# Patient Record
Sex: MALE | Race: WHITE | HISPANIC OR LATINO | Employment: PART TIME | URBAN - METROPOLITAN AREA
[De-identification: names, ages, dates, MRNs, and addresses within clinical notes are randomized per-mention and may not be internally consistent; named-entity substitution may affect disease eponyms.]

---

## 2017-03-11 ENCOUNTER — OFFICE VISIT (OUTPATIENT)
Dept: URGENT CARE | Facility: URGENT CARE | Age: 42
End: 2017-03-11
Payer: OTHER MISCELLANEOUS

## 2017-03-11 ENCOUNTER — RADIANT APPOINTMENT (OUTPATIENT)
Dept: GENERAL RADIOLOGY | Facility: CLINIC | Age: 42
End: 2017-03-11
Attending: PHYSICIAN ASSISTANT
Payer: OTHER MISCELLANEOUS

## 2017-03-11 VITALS
BODY MASS INDEX: 24.03 KG/M2 | WEIGHT: 140 LBS | SYSTOLIC BLOOD PRESSURE: 110 MMHG | DIASTOLIC BLOOD PRESSURE: 76 MMHG | TEMPERATURE: 98 F

## 2017-03-11 DIAGNOSIS — S46.912A STRAIN OF UPPER ARM, LEFT, INITIAL ENCOUNTER: ICD-10-CM

## 2017-03-11 DIAGNOSIS — S49.92XA LEFT UPPER ARM INJURY, INITIAL ENCOUNTER: Primary | ICD-10-CM

## 2017-03-11 DIAGNOSIS — S49.92XA LEFT UPPER ARM INJURY, INITIAL ENCOUNTER: ICD-10-CM

## 2017-03-11 PROCEDURE — 99213 OFFICE O/P EST LOW 20 MIN: CPT | Performed by: PHYSICIAN ASSISTANT

## 2017-03-11 PROCEDURE — 73030 X-RAY EXAM OF SHOULDER: CPT | Mod: LT

## 2017-03-11 RX ORDER — IBUPROFEN 600 MG/1
600 TABLET, FILM COATED ORAL EVERY 6 HOURS PRN
Qty: 30 TABLET | Refills: 1 | Status: SHIPPED | OUTPATIENT
Start: 2017-03-11

## 2017-03-11 NOTE — MR AVS SNAPSHOT
"              After Visit Summary   3/11/2017    Ivan Reyes Santiago    MRN: [id number]           Patient Information     Date Of Birth          1975        Visit Information        Provider Department      3/11/2017 10:30 AM Param Scott PA-C [facility]        Today's Diagnoses     Left upper arm injury, initial encounter    -  1    Strain of upper arm, left, initial encounter           Follow-ups after your visit        Who to contact     If you have questions or need follow up information about today's clinic visit or your schedule please contact [facility] directly at [phone].  Normal or non-critical lab and imaging results will be communicated to you by Bangeehart, letter or phone within 4 business days after the clinic has received the results. If you do not hear from us within 7 days, please contact the clinic through Bangeehart or phone. If you have a critical or abnormal lab result, we will notify you by phone as soon as possible.  Submit refill requests through txtr or call your pharmacy and they will forward the refill request to us. Please allow 3 business days for your refill to be completed.          Additional Information About Your Visit        MyChart Information     txtr lets you send messages to your doctor, view your test results, renew your prescriptions, schedule appointments and more. To sign up, go to [email or web address] . Click on \"Log in\" on the left side of the screen, which will take you to the Welcome page. Then click on \"Sign up Now\" on the right side of the page.     You will be asked to enter the access code listed below, as well as some personal information. Please follow the directions to create your username and password.     Your access code is: [email or web address]  Expires: 6/15/2017  9:21 AM     Your access code will  in 90 days. If you need help or a new code, please call your [place] clinic or " [phone].        Care EveryWhere ID     This is your Care EveryWhere ID. This could be used by other organizations to access your [place] medical records  [id number]        Your Vitals Were     Temperature BMI (Body Mass Index)                98  F (36.7  C) (Oral) 24.03 kg/m2           Blood Pressure from Last 3 Encounters:   03/11/17 110/76   09/23/16 120/68   09/14/16 102/70    Weight from Last 3 Encounters:   03/11/17 140 lb (63.5 kg)   09/23/16 143 lb (64.9 kg)   09/14/16 143 lb (64.9 kg)                 Today's Medication Changes          These changes are accurate as of: 3/11/17 11:59 PM.  If you have any questions, ask your nurse or doctor.               Start taking these medicines.        Dose/Directions    ibuprofen 600 MG tablet   Commonly known as:  ADVIL/MOTRIN   Used for:  Left upper arm injury, initial encounter, Strain of upper arm, left, initial encounter   Started by:  Param Scott PA-C        Dose:  600 mg   Take 1 tablet (600 mg) by mouth every 6 hours as needed for moderate pain   Quantity:  30 tablet   Refills:  1         Stop taking these medicines if you haven't already. Please contact your care team if you have questions.     cetirizine 10 MG tablet   Commonly known as:  zyrTEC   Stopped by:  Param Scott PA-C           methylPREDNISolone 4 MG tablet   Commonly known as:  MEDROL DOSEPAK   Stopped by:  Param Scott PA-C           PATANOL 0.1 % ophthalmic solution   Generic drug:  olopatadine   Stopped by:  Param Scott PA-C           THERAFLU FLU/COLD PO   Stopped by:  Param Scott PA-C                Where to get your medicines      These medications were sent to [id number] - [place], [place] - [address] [facility] [address], [facility] [address]    Hours:  24-hours Phone:  [phone]     ibuprofen 600 MG tablet                Primary Care Provider    None Specified       No primary provider on file.        Thank  you!     Thank you for choosing [facility]  for your care. Our goal is always to provide you with excellent care. Hearing back from our patients is one way we can continue to improve our services. Please take a few minutes to complete the written survey that you may receive in the mail after your visit with us. Thank you!             Your Updated Medication List - Protect others around you: Learn how to safely use, store and throw away your medicines at www.disposemymeds.org.          This list is accurate as of: 3/11/17 11:59 PM.  Always use your most recent med list.                   Brand Name Dispense Instructions for use    ibuprofen 600 MG tablet    ADVIL/MOTRIN    30 tablet    Take 1 tablet (600 mg) by mouth every 6 hours as needed for moderate pain       OMEGA-3 FISH OIL PO

## 2017-03-11 NOTE — NURSING NOTE
"Chief Complaint   Patient presents with     Arm Injury     lt arm injury last night at work,sustained when lifting a heavy object     Work Comp       Initial /76 (BP Location: Right arm, Patient Position: Chair, Cuff Size: Adult Regular)  Temp 98  F (36.7  C) (Oral)  Wt 140 lb (63.5 kg)  BMI 24.03 kg/m2 Estimated body mass index is 24.03 kg/(m^2) as calculated from the following:    Height as of 9/23/16: 5' 4\" (1.626 m).    Weight as of this encounter: 140 lb (63.5 kg).  Medication Reconciliation: complete   Néstor MEYERS      "

## 2017-03-11 NOTE — LETTER
[facility]  [address] [address]  [phone]        2017    REPORT OF WORK ABILITY    PATIENT DATA  Employee Name: Ivan Reyes Santiago        : 1975   [id number]  Work related injury: YES  Today's date: 2017  Date of injury: 3/11/2017     PROVIDER EVALUATION: Please fill in as needed.  Please give copy to employee for employer.  1. Diagnosis: left upper arm, shoulder strain  2. Treatment: ice, rest, medication  3. Medication: motrin  NOTE: When ordering a medication, [place] Rules require Work Comp or WC on prescriptions.  4. Return to work date: 3/11/17    RESTRICTIONS: No overhead lifting left arm, lifting restrictions of left upper arm to 10 lbs or less., Limited use - pushing/pulling left arm      Medical Examiner: Param Scott [facility] PAC  :     Next appointment: 1-2 weeks with occupational medicine if needed    CC: Employer, Managed Care Plan/Payor, Patient

## 2017-03-17 NOTE — PROGRESS NOTES
SUBJECTIVE:  Chief Complaint   Patient presents with     Arm Injury     lt arm injury last night at work,sustained when lifting a heavy object     Work Comp     Ivan Reyes Santiago is a 41 year old male presents with a chief complaint of left upper arm tenderness, shoulder area .  The injury occurred 1 day(s) ago.   The injury happened while at work. How: lifting.  The patient complained of mild and moderate pain  and has had decreased ROM.  Pain exacerbated by movement.  Relieved by rest.  He treated it initially with no therapy. This is the first time this type of injury has occurred to this patient.     Past Medical History   Diagnosis Date     Foot pain, right      Allergies   Allergen Reactions     Contrast Dye Rash     Social History   Substance Use Topics     Smoking status: Former Smoker     Quit date: 9/14/2014     Smokeless tobacco: Never Used      Comment: 1 cigarette every 2 months     Alcohol use 0.0 oz/week     0 Standard drinks or equivalent per week      Comment: occasional       ROS:  CONSTITUTIONAL:NEGATIVE for fever, chills, change in weight  INTEGUMENTARY/SKIN: NEGATIVE for worrisome rashes, moles or lesions  MUSCULOSKELETAL: POSITIVE  for left shoulder, upper arm  VASC: NEGATIVE for cold extremities  NEURO: NEGATIVE for weakness, dizziness or paresthesias    EXAM:   /76 (BP Location: Right arm, Patient Position: Chair, Cuff Size: Adult Regular)  Temp 98  F (36.7  C) (Oral)  Wt 140 lb (63.5 kg)  BMI 24.03 kg/m2  Gen: healthy,alert,no distress  Extremity: shoulder has point tenderness anterior and superior aspect.   There is not compromise to the distal circulation.  Pulses are +2 and CRT is brisk  GENERAL APPEARANCE: healthy, alert and no distress  EXTREMITIES: peripheral pulses normal  MS:  Positive for left shoulder pain with ROM  SKIN: no suspicious lesions or rashes  NEURO: Normal strength and tone, sensory exam grossly normal, mentation intact and speech normal    X-RAY was done and  negative for acute changes including fracture Xray read by Param Scott at time of visit    ASSESSMENT/PLAN:      ICD-10-CM    1. Left upper arm injury, initial encounter S49.92XA XR Shoulder Left G/E 3 Views     ibuprofen (ADVIL/MOTRIN) 600 MG tablet   2. Strain of upper arm, left, initial encounter S46.912A XR Shoulder Left G/E 3 Views     ibuprofen (ADVIL/MOTRIN) 600 MG tablet     RICE treatment: Rest, Ice, compression, elevation   Work comp follow up advised  Note for work written

## 2017-03-26 ENCOUNTER — OFFICE VISIT (OUTPATIENT)
Dept: URGENT CARE | Facility: URGENT CARE | Age: 42
End: 2017-03-26
Payer: COMMERCIAL

## 2017-03-26 VITALS
WEIGHT: 142.8 LBS | TEMPERATURE: 98.5 F | OXYGEN SATURATION: 98 % | HEIGHT: 64 IN | HEART RATE: 82 BPM | BODY MASS INDEX: 24.38 KG/M2

## 2017-03-26 DIAGNOSIS — R07.0 THROAT PAIN: Primary | ICD-10-CM

## 2017-03-26 DIAGNOSIS — J04.2 LARYNGOTRACHEITIS: ICD-10-CM

## 2017-03-26 LAB
DEPRECATED S PYO AG THROAT QL EIA: NORMAL
MICRO REPORT STATUS: NORMAL
SPECIMEN SOURCE: NORMAL

## 2017-03-26 PROCEDURE — 99213 OFFICE O/P EST LOW 20 MIN: CPT | Performed by: FAMILY MEDICINE

## 2017-03-26 PROCEDURE — 87880 STREP A ASSAY W/OPTIC: CPT | Performed by: PHYSICIAN ASSISTANT

## 2017-03-26 PROCEDURE — 87081 CULTURE SCREEN ONLY: CPT | Performed by: PHYSICIAN ASSISTANT

## 2017-03-26 RX ORDER — AZITHROMYCIN 250 MG/1
TABLET, FILM COATED ORAL
Qty: 6 TABLET | Refills: 0 | Status: SHIPPED | OUTPATIENT
Start: 2017-03-26 | End: 2018-02-05

## 2017-03-26 NOTE — NURSING NOTE
"Chief Complaint   Patient presents with     Flu     body aches, chils, sweats 3x weeks     Pharyngitis     sore throat, cough 2xweeks        Initial Pulse 82  Temp 98.5  F (36.9  C) (Oral)  Ht 5' 4\" (1.626 m)  Wt 142 lb 12.8 oz (64.8 kg)  SpO2 98%  BMI 24.51 kg/m2 Estimated body mass index is 24.51 kg/(m^2) as calculated from the following:    Height as of this encounter: 5' 4\" (1.626 m).    Weight as of this encounter: 142 lb 12.8 oz (64.8 kg).  Medication Reconciliation: complete    "

## 2017-03-26 NOTE — MR AVS SNAPSHOT
"              After Visit Summary   3/26/2017    Ivan Reyes Santiago    MRN: [id number]           Patient Information     Date Of Birth          1975        Visit Information        Provider Department      3/26/2017 6:30 PM Santi Schmidt MD [facility]        Today's Diagnoses     Throat pain    -  1    Laryngotracheitis           Follow-ups after your visit        Who to contact     If you have questions or need follow up information about today's clinic visit or your schedule please contact [facility] directly at [phone].  Normal or non-critical lab and imaging results will be communicated to you by Renkoohart, letter or phone within 4 business days after the clinic has received the results. If you do not hear from us within 7 days, please contact the clinic through Renkoohart or phone. If you have a critical or abnormal lab result, we will notify you by phone as soon as possible.  Submit refill requests through Kihon or call your pharmacy and they will forward the refill request to us. Please allow 3 business days for your refill to be completed.          Additional Information About Your Visit        MyChart Information     Kihon lets you send messages to your doctor, view your test results, renew your prescriptions, schedule appointments and more. To sign up, go to [email or web address] . Click on \"Log in\" on the left side of the screen, which will take you to the Welcome page. Then click on \"Sign up Now\" on the right side of the page.     You will be asked to enter the access code listed below, as well as some personal information. Please follow the directions to create your username and password.     Your access code is: [email or web address]  Expires: 6/15/2017  9:21 AM     Your access code will  in 90 days. If you need help or a new code, please call your [place] clinic or [phone].        Care EveryWhere ID     This is your " "Care EveryWhere ID. This could be used by other organizations to access your [facility] medical records  [id number]        Your Vitals Were     Pulse Temperature Height Pulse Oximetry BMI (Body Mass Index)       82 98.5  F (36.9  C) (Oral) 5' 4\" (1.626 m) 98% 24.51 kg/m2        Blood Pressure from Last 3 Encounters:   03/11/17 110/76   09/23/16 120/68   09/14/16 102/70    Weight from Last 3 Encounters:   03/26/17 142 lb 12.8 oz (64.8 kg)   03/11/17 140 lb (63.5 kg)   09/23/16 143 lb (64.9 kg)              We Performed the Following     Beta strep group A culture     Rapid strep screen          Today's Medication Changes          These changes are accurate as of: 3/26/17  7:50 PM.  If you have any questions, ask your nurse or doctor.               Start taking these medicines.        Dose/Directions    azithromycin 250 MG tablet   Commonly known as:  ZITHROMAX   Used for:  Throat pain, Laryngotracheitis   Started by:  Santi Schmidt MD        Two tablets first day, then one tablet daily for four days.   Quantity:  6 tablet   Refills:  0            Where to get your medicines      These medications were sent to [facility] [id number] [place] [address] [address] AT [facility] [address]  [address] [facility] [address]    Hours:  24-hours Phone:  [phone]     azithromycin 250 MG tablet                Primary Care Provider    None Specified       No primary provider on file.        Thank you!     Thank you for choosing [facility]  for your care. Our goal is always to provide you with excellent care. Hearing back from our patients is one way we can continue to improve our services. Please take a few minutes to complete the written survey that you may receive in the mail after your visit with us. Thank you!             Your Updated Medication List - Protect others around you: Learn how to safely use, store and throw away your medicines at " www.disposemymeds.org.          This list is accurate as of: 3/26/17  7:50 PM.  Always use your most recent med list.                   Brand Name Dispense Instructions for use    azithromycin 250 MG tablet    ZITHROMAX    6 tablet    Two tablets first day, then one tablet daily for four days.       ibuprofen 600 MG tablet    ADVIL/MOTRIN    30 tablet    Take 1 tablet (600 mg) by mouth every 6 hours as needed for moderate pain       OMEGA-3 FISH OIL PO

## 2017-03-27 NOTE — PROGRESS NOTES
Complanis of sore throat for three days.  Associated symptoms include associate uri nofever tender swollen glands positive strep contact slight headache mild stomach upset.    Review of systems shows no problems with vision,hearing or learning  No heart murmer, asthma, bowel or bladder problems, rashes, back or muscle problems,dental problems, headaches,balance or frequent infections  On exam the vital signs are stable. no neck nodes no necks stiffness or thyromegaly.    No bruits, murmers, rubs or extrasounds. No cardiomegaly or chest wall tenderness. Lungs clear, no abdominal masses or organomegaly. No CVA tenderness.    Strep test is negative   Treatment  For laryngotracheitis : zpak  and adjunctive vitimin c  (R07.0) Throat pain  (primary encounter diagnosis)  Comment:   Plan: Rapid strep screen, Beta strep group A culture,        azithromycin (ZITHROMAX) 250 MG tablet            (J04.2) Laryngotracheitis  Comment:   Plan: azithromycin (ZITHROMAX) 250 MG tablet

## 2017-03-28 LAB
BACTERIA SPEC CULT: NORMAL
MICRO REPORT STATUS: NORMAL
SPECIMEN SOURCE: NORMAL

## 2018-02-05 ENCOUNTER — OFFICE VISIT (OUTPATIENT)
Dept: URGENT CARE | Facility: URGENT CARE | Age: 43
End: 2018-02-05
Payer: COMMERCIAL

## 2018-02-05 VITALS
TEMPERATURE: 98.1 F | BODY MASS INDEX: 23.71 KG/M2 | DIASTOLIC BLOOD PRESSURE: 64 MMHG | OXYGEN SATURATION: 99 % | HEART RATE: 87 BPM | SYSTOLIC BLOOD PRESSURE: 102 MMHG | WEIGHT: 138.13 LBS

## 2018-02-05 DIAGNOSIS — J11.1 INFLUENZA: Primary | ICD-10-CM

## 2018-02-05 PROCEDURE — 99214 OFFICE O/P EST MOD 30 MIN: CPT | Performed by: FAMILY MEDICINE

## 2018-02-05 RX ORDER — OSELTAMIVIR PHOSPHATE 75 MG/1
75 CAPSULE ORAL 2 TIMES DAILY
Qty: 10 CAPSULE | Refills: 0 | Status: SHIPPED | OUTPATIENT
Start: 2018-02-05 | End: 2018-02-10

## 2018-02-05 NOTE — PROGRESS NOTES
SUBJECTIVE: Ivan Reyes Santiago is a 42 year old male presenting with a chief complaint of fever, nasal congestion and cough .  Onset of symptoms was 2 day(s) ago.  Course of illness is worsening.    Severity moderate  Current and Associated symptoms: stuffy nose and cough - non-productive  Treatment measures tried include Tylenol/Ibuprofen.  Predisposing factors include None.    Past Medical History:   Diagnosis Date     Foot pain, right      Allergies   Allergen Reactions     Contrast Dye Rash     Social History   Substance Use Topics     Smoking status: Former Smoker     Quit date: 9/14/2014     Smokeless tobacco: Never Used      Comment: 1 cigarette every 2 months     Alcohol use 0.0 oz/week     0 Standard drinks or equivalent per week      Comment: occasional       ROS:  SKIN: no rash  GI: no vomiting    OBJECTIVE:  /64 (Cuff Size: Adult Regular)  Pulse 87  Temp 98.1  F (36.7  C) (Oral)  Wt 138 lb 2 oz (62.7 kg)  SpO2 99%  BMI 23.71 kg/h8YLWYPME APPEARANCE: healthy, alert and no distress  EYES: EOMI,  PERRL, conjunctiva clear  HENT: ear canals and TM's normal.  Nose and mouth without ulcers, erythema or lesions  NECK: supple, nontender, no lymphadenopathy  RESP: lungs clear to auscultation - no rales, rhonchi or wheezes  SKIN: no suspicious lesions or rashes      ICD-10-CM    1. Influenza J11.1 oseltamivir (TAMIFLU) 75 MG capsule       Fluids/Rest, f/u if worse/not any better

## 2018-02-05 NOTE — PATIENT INSTRUCTIONS
Influenza (Adult)    Influenza is also called the flu. It is a viral illness that affects the air passages of your lungs. It is different from the common cold. The flu can easily be passed from one to person to another. It may be spread through the air by coughing and sneezing. Or it can be spread by touching the sick person and then touching your own eyes, nose, or mouth.  The flu starts 1 to 3 days after you are exposed to the flu virus. It may last for 1 to 2 weeks but many people feel tired or fatigued for many weeks afterward. You usually don t need to take antibiotics unless you have a complication. This might be an ear or sinus infection or pneumonia.  Symptoms of the flu may be mild or severe. They can include extreme tiredness (wanting to stay in bed all day), chills, fevers, muscle aches, soreness with eye movement, headache, and a dry, hacking cough.  Home care  Follow these guidelines when caring for yourself at home:    Avoid being around cigarette smoke, whether yours or other people s.    Acetaminophen or ibuprofen will help ease your fever, muscle aches, and headache. Don t give aspirin to anyone younger than 18 who has the flu. Aspirin can harm the liver.    Nausea and loss of appetite are common with the flu. Eat light meals. Drink 6 to 8 glasses of liquids every day. Good choices are water, sport drinks, soft drinks without caffeine, juices, tea, and soup. Extra fluids will also help loosen secretions in your nose and lungs.    Over-the-counter cold medicines will not make the flu go away faster. But the medicines may help with coughing, sore throat, and congestion in your nose and sinuses. Don t use a decongestant if you have high blood pressure.    Stay home until your fever has been gone for at least 24 hours without using medicine to reduce fever.  Follow-up care  Follow up with your healthcare provider, or as advised, if you are not getting better over the next week.  If you are age 65 or  older, talk with your provider about getting a pneumococcal vaccine every 5 years. You should also get this vaccine if you have chronic asthma or COPD. All adults should get a flu vaccine every fall. Ask your provider about this.  When to seek medical advice  Call your healthcare provider right away if any of these occur:    Cough with lots of colored mucus (sputum) or blood in your mucus    Chest pain, shortness of breath, wheezing, or trouble breathing    Severe headache, or face, neck, or ear pain    New rash with fever    Fever of 100.4 F (38 C) or higher, or as directed by your healthcare provider    Confusion, behavior change, or seizure    Severe weakness or dizziness    You get a new fever or cough after getting better for a few days  Date Last Reviewed: 1/1/2017 2000-2017 [employer] [address] [place] [address], [place] [address]. All rights reserved. This information is not intended as a substitute for professional medical care. Always follow your healthcare professional's instructions.

## 2018-02-05 NOTE — NURSING NOTE
"Chief Complaint   Patient presents with     Cough     sx for 2 days,cough,fever,chills,aches       Initial /64 (Cuff Size: Adult Regular)  Pulse 87  Temp 98.1  F (36.7  C) (Oral)  Wt 138 lb 2 oz (62.7 kg)  SpO2 99%  BMI 23.71 kg/m2 Estimated body mass index is 23.71 kg/(m^2) as calculated from the following:    Height as of 3/26/17: 5' 4\" (1.626 m).    Weight as of this encounter: 138 lb 2 oz (62.7 kg).  Medication Reconciliation: complete Néstor MEYERS    "
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
## 2020-08-05 ENCOUNTER — HOSPITAL ENCOUNTER (EMERGENCY)
Facility: CLINIC | Age: 45
Discharge: HOME | End: 2020-08-05
Attending: NURSE PRACTITIONER | Admitting: NURSE PRACTITIONER
Payer: COMMERCIAL

## 2020-08-05 ENCOUNTER — APPOINTMENT (OUTPATIENT)
Dept: GENERAL RADIOLOGY | Facility: CLINIC | Age: 45
End: 2020-08-05
Attending: NURSE PRACTITIONER
Payer: COMMERCIAL

## 2020-08-05 VITALS
HEIGHT: 64 IN | BODY MASS INDEX: 23.9 KG/M2 | RESPIRATION RATE: 16 BRPM | SYSTOLIC BLOOD PRESSURE: 144 MMHG | OXYGEN SATURATION: 99 % | TEMPERATURE: 98.5 F | DIASTOLIC BLOOD PRESSURE: 98 MMHG | WEIGHT: 140 LBS

## 2020-08-05 DIAGNOSIS — M25.561 RIGHT KNEE PAIN: ICD-10-CM

## 2020-08-05 DIAGNOSIS — M25.469 EFFUSION OF KNEE: ICD-10-CM

## 2020-08-05 PROCEDURE — 73562 X-RAY EXAM OF KNEE 3: CPT | Mod: RT

## 2020-08-05 PROCEDURE — 25000132 ZZH RX MED GY IP 250 OP 250 PS 637: Performed by: NURSE PRACTITIONER

## 2020-08-05 PROCEDURE — 99283 EMERGENCY DEPT VISIT LOW MDM: CPT

## 2020-08-05 RX ORDER — ACETAMINOPHEN 325 MG/1
975 TABLET ORAL ONCE
Status: COMPLETED | OUTPATIENT
Start: 2020-08-05 | End: 2020-08-05

## 2020-08-05 RX ORDER — IBUPROFEN 600 MG/1
600 TABLET, FILM COATED ORAL ONCE
Status: COMPLETED | OUTPATIENT
Start: 2020-08-05 | End: 2020-08-05

## 2020-08-05 RX ADMIN — ACETAMINOPHEN 975 MG: 325 TABLET, FILM COATED ORAL at 20:39

## 2020-08-05 RX ADMIN — IBUPROFEN 600 MG: 600 TABLET ORAL at 20:39

## 2020-08-05 ASSESSMENT — MIFFLIN-ST. JEOR: SCORE: 1426.04

## 2020-08-05 ASSESSMENT — ENCOUNTER SYMPTOMS: NUMBNESS: 0

## 2020-08-05 NOTE — LETTER
August 5, 2020      To Whom It May Concern:      Ivan Reyes Santiago was seen in our Emergency Department today, 08/05/20.  I expect his condition to improve over the next 2-3 days.  He may return to work after follow up with ortho.    Sincerely,        Jillian Jaeger RN
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
## 2020-08-10 ENCOUNTER — TRANSFERRED RECORDS (OUTPATIENT)
Dept: HEALTH INFORMATION MANAGEMENT | Facility: CLINIC | Age: 45
End: 2020-08-10

## 2024-02-24 ENCOUNTER — OFFICE VISIT (OUTPATIENT)
Dept: URGENT CARE | Facility: URGENT CARE | Age: 49
End: 2024-02-24
Payer: COMMERCIAL

## 2024-02-24 ENCOUNTER — HOSPITAL ENCOUNTER (EMERGENCY)
Facility: CLINIC | Age: 49
Discharge: HOME | End: 2024-02-24
Attending: EMERGENCY MEDICINE | Admitting: EMERGENCY MEDICINE
Payer: COMMERCIAL

## 2024-02-24 VITALS
SYSTOLIC BLOOD PRESSURE: 157 MMHG | OXYGEN SATURATION: 100 % | RESPIRATION RATE: 18 BRPM | HEIGHT: 64 IN | BODY MASS INDEX: 26.63 KG/M2 | DIASTOLIC BLOOD PRESSURE: 94 MMHG | TEMPERATURE: 98.7 F | WEIGHT: 156 LBS | HEART RATE: 81 BPM

## 2024-02-24 VITALS
SYSTOLIC BLOOD PRESSURE: 142 MMHG | OXYGEN SATURATION: 99 % | TEMPERATURE: 96.9 F | HEART RATE: 83 BPM | DIASTOLIC BLOOD PRESSURE: 97 MMHG | BODY MASS INDEX: 26.74 KG/M2 | RESPIRATION RATE: 16 BRPM | WEIGHT: 155.8 LBS

## 2024-02-24 DIAGNOSIS — R29.898 HEAVINESS OF UPPER EXTREMITY: ICD-10-CM

## 2024-02-24 DIAGNOSIS — R20.0 NUMBNESS AND TINGLING OF LEFT SIDE OF FACE: Primary | ICD-10-CM

## 2024-02-24 DIAGNOSIS — R20.2 NUMBNESS AND TINGLING OF LEFT SIDE OF FACE: Primary | ICD-10-CM

## 2024-02-24 DIAGNOSIS — R07.89 ATYPICAL CHEST PAIN: ICD-10-CM

## 2024-02-24 DIAGNOSIS — M54.12 CERVICAL RADICULOPATHY: ICD-10-CM

## 2024-02-24 LAB
ANION GAP SERPL CALCULATED.3IONS-SCNC: 10 MMOL/L (ref 7–15)
ATRIAL RATE - MUSE: 86 BPM
BASOPHILS # BLD AUTO: 0 10E3/UL (ref 0–0.2)
BASOPHILS NFR BLD AUTO: 0 %
BUN SERPL-MCNC: 17.6 MG/DL (ref 6–20)
CALCIUM SERPL-MCNC: 9.5 MG/DL (ref 8.6–10)
CHLORIDE SERPL-SCNC: 103 MMOL/L (ref 98–107)
CREAT SERPL-MCNC: 1.31 MG/DL (ref 0.67–1.17)
DEPRECATED HCO3 PLAS-SCNC: 25 MMOL/L (ref 22–29)
DIASTOLIC BLOOD PRESSURE - MUSE: NORMAL MMHG
EGFRCR SERPLBLD CKD-EPI 2021: 66 ML/MIN/1.73M2
EOSINOPHIL # BLD AUTO: 0.2 10E3/UL (ref 0–0.7)
EOSINOPHIL NFR BLD AUTO: 2 %
ERYTHROCYTE [DISTWIDTH] IN BLOOD BY AUTOMATED COUNT: 12.7 % (ref 10–15)
GLUCOSE SERPL-MCNC: 114 MG/DL (ref 70–99)
HCT VFR BLD AUTO: 42.5 % (ref 40–53)
HGB BLD-MCNC: 14.7 G/DL (ref 13.3–17.7)
HOLD SPECIMEN: NORMAL
IMM GRANULOCYTES # BLD: 0.1 10E3/UL
IMM GRANULOCYTES NFR BLD: 1 %
INTERPRETATION ECG - MUSE: NORMAL
LYMPHOCYTES # BLD AUTO: 1.8 10E3/UL (ref 0.8–5.3)
LYMPHOCYTES NFR BLD AUTO: 23 %
MCH RBC QN AUTO: 30.4 PG (ref 26.5–33)
MCHC RBC AUTO-ENTMCNC: 34.6 G/DL (ref 31.5–36.5)
MCV RBC AUTO: 88 FL (ref 78–100)
MONOCYTES # BLD AUTO: 0.6 10E3/UL (ref 0–1.3)
MONOCYTES NFR BLD AUTO: 8 %
NEUTROPHILS # BLD AUTO: 5.1 10E3/UL (ref 1.6–8.3)
NEUTROPHILS NFR BLD AUTO: 66 %
NRBC # BLD AUTO: 0 10E3/UL
NRBC BLD AUTO-RTO: 0 /100
P AXIS - MUSE: 20 DEGREES
PLATELET # BLD AUTO: 271 10E3/UL (ref 150–450)
POTASSIUM SERPL-SCNC: 3.7 MMOL/L (ref 3.4–5.3)
PR INTERVAL - MUSE: 164 MS
QRS DURATION - MUSE: 88 MS
QT - MUSE: 342 MS
QTC - MUSE: 409 MS
R AXIS - MUSE: 34 DEGREES
RBC # BLD AUTO: 4.83 10E6/UL (ref 4.4–5.9)
SODIUM SERPL-SCNC: 138 MMOL/L (ref 135–145)
SYSTOLIC BLOOD PRESSURE - MUSE: NORMAL MMHG
T AXIS - MUSE: -17 DEGREES
TROPONIN T SERPL HS-MCNC: 6 NG/L
VENTRICULAR RATE- MUSE: 86 BPM
WBC # BLD AUTO: 7.7 10E3/UL (ref 4–11)

## 2024-02-24 PROCEDURE — 99205 OFFICE O/P NEW HI 60 MIN: CPT | Performed by: NURSE PRACTITIONER

## 2024-02-24 PROCEDURE — 84484 ASSAY OF TROPONIN QUANT: CPT | Performed by: EMERGENCY MEDICINE

## 2024-02-24 PROCEDURE — 85025 COMPLETE CBC W/AUTO DIFF WBC: CPT | Performed by: EMERGENCY MEDICINE

## 2024-02-24 PROCEDURE — 80048 BASIC METABOLIC PNL TOTAL CA: CPT | Performed by: EMERGENCY MEDICINE

## 2024-02-24 PROCEDURE — 93005 ELECTROCARDIOGRAM TRACING: CPT

## 2024-02-24 PROCEDURE — 36415 COLL VENOUS BLD VENIPUNCTURE: CPT | Performed by: EMERGENCY MEDICINE

## 2024-02-24 PROCEDURE — 99284 EMERGENCY DEPT VISIT MOD MDM: CPT

## 2024-02-24 RX ORDER — METHOCARBAMOL 500 MG/1
1000 TABLET, FILM COATED ORAL 4 TIMES DAILY PRN
Qty: 20 TABLET | Refills: 0 | Status: SHIPPED | OUTPATIENT
Start: 2024-02-24

## 2024-02-24 RX ORDER — ROSUVASTATIN CALCIUM 20 MG/1
20 TABLET, COATED ORAL DAILY
COMMUNITY

## 2024-02-24 RX ORDER — PREDNISONE 20 MG/1
TABLET ORAL
Qty: 10 TABLET | Refills: 0 | Status: SHIPPED | OUTPATIENT
Start: 2024-02-24

## 2024-02-24 ASSESSMENT — ACTIVITIES OF DAILY LIVING (ADL): ADLS_ACUITY_SCORE: 33

## 2024-02-24 ASSESSMENT — COLUMBIA-SUICIDE SEVERITY RATING SCALE - C-SSRS
2. HAVE YOU ACTUALLY HAD ANY THOUGHTS OF KILLING YOURSELF IN THE PAST MONTH?: NO
6. HAVE YOU EVER DONE ANYTHING, STARTED TO DO ANYTHING, OR PREPARED TO DO ANYTHING TO END YOUR LIFE?: NO
1. IN THE PAST MONTH, HAVE YOU WISHED YOU WERE DEAD OR WISHED YOU COULD GO TO SLEEP AND NOT WAKE UP?: NO

## 2024-02-24 NOTE — ED TRIAGE NOTES
Having left facial, shoulder and arm numbness for last few days. Left face with mild swelling noted.  Denies of any weakness in any extremities. S/p left eye surgery for retinal detachment one month ago and been recovering well.      Triage Assessment (Adult)       Row Name 02/24/24 1514          Triage Assessment    Airway WDL WDL        Respiratory WDL    Respiratory WDL WDL        Skin Circulation/Temperature WDL    Skin Circulation/Temperature WDL WDL        Cardiac WDL    Cardiac WDL WDL        Peripheral/Neurovascular WDL    Peripheral Neurovascular WDL WDL        Cognitive/Neuro/Behavioral WDL    Cognitive/Neuro/Behavioral WDL WDL

## 2024-02-24 NOTE — PATIENT INSTRUCTIONS
Patient is referred to [facility] emergency room emergently for assessment.   Opioid Pregnancy And Lactation Text: These medications can lead to premature delivery and should be avoided during pregnancy. These medications are also present in breast milk in small amounts.

## 2024-02-25 NOTE — ED PROVIDER NOTES
History     Chief Complaint:  Numbness       HPI   Ivan Reyes Santiago is a 48 year old male with history of hypertension, hypercholesterolemia, 2 episodes of retinal detachment on the left side with recent surgery presents with discomfort and numbness or fullness feeling to the left lower jaw as well as to the left neck and arm.  He notes intermittent symptoms to the face and can up points to the parotid area.  Denies any pain with chewing and denies any dental pain.  He has discomfort into the left arm and neck.  He has some tingling and numbness to the palm.  He has no symptoms in the leg.  He notes that his vision is gradually coming back.  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  He is right-hand dominant.  Denies any injury.  He has no history of neck or back issues.  He has no history of stroke.  Denies chest pain or shortness of breath.      Independent Historian:    Patient and wife    Review of External Notes:  N/A    Medications:    methocarbamol (ROBAXIN) 500 MG tablet  predniSONE (DELTASONE) 20 MG tablet  ibuprofen (ADVIL/MOTRIN) 600 MG tablet  Omega-3 Fatty Acids (OMEGA-3 FISH OIL PO)  rosuvastatin (CRESTOR) 20 MG tablet        Past Medical History:    Past Medical History:   Diagnosis Date    Foot pain, right        Past Surgical History:    Past Surgical History:   Procedure Laterality Date    NO HISTORY OF SURGERY            Physical Exam   No data found.       Physical Exam  GENERAL: well developed, pleasant  HEAD: atraumatic  EYES: pupils reactive, extraocular muscles intact, conjunctivae normal  ENT:  mucus membranes moist, dentition is nontender to percussion with a tongue blade, palpating the parotid gland and looking intraorally no significant tenderness to this area and no drainage from the duct, no obvious facial swelling that he is talking about I cannot appreciate it no facial warm or redness, TMs are clear no herpetic lesion no otitis  media, no pain to the mastoid bone  NECK:  trachea midline, normal range of motion, Spurling's test is mildly positive with symptoms on the left  RESPIRATORY: no tachypnea, breath sounds clear to auscultation   CVS: normal S1/S2, no murmurs, intact distal pulses  ABDOMEN: soft, nontender, nondistention  MUSCULOSKELETAL: no deformities  SKIN: warm and dry, no acute rashes or ulceration  NEURO: GCS 15, cranial nerves intact, alert and oriented x3, normal finger-to-nose equal  strength Romberg is normal equal biceps and triceps, normal gait  PSYCH:  Mood/affect normal      Emergency Department Course     ECG results from 02/24/24   EKG 12-lead, tracing only     Value    Systolic Blood Pressure     Diastolic Blood Pressure     Ventricular Rate 86    Atrial Rate 86    OR Interval 164    QRS Duration 88        QTc 409    P Axis 20    R AXIS 34    T Axis -17    Interpretation ECG      Sinus rhythm  Minimal voltage criteria for LVH, may be normal variant ( R in aVL )  Inferior infarct , age undetermined  Abnormal ECG  No previous ECGs available  Confirmed by GENERATED REPORT, COMPUTER ([id number]),  MARIBEL AUGUST ([id number]) on 2/24/2024 9:24:09 PM           Imaging:  Echo Stress Echocardiogram    (Results Pending)     Report per na    Laboratory:  Labs Ordered and Resulted from Time of ED Arrival to Time of ED Departure   BASIC METABOLIC PANEL - Abnormal       Result Value    Sodium 138      Potassium 3.7      Chloride 103      Carbon Dioxide (CO2) 25      Anion Gap 10      Urea Nitrogen 17.6      Creatinine 1.31 (*)     GFR Estimate 66      Calcium 9.5      Glucose 114 (*)    TROPONIN T, HIGH SENSITIVITY - Normal    Troponin T, High Sensitivity 6     CBC WITH PLATELETS AND DIFFERENTIAL    WBC Count 7.7      RBC Count 4.83      Hemoglobin 14.7      Hematocrit 42.5      MCV 88      MCH 30.4      MCHC 34.6      RDW 12.7      Platelet Count 271      % Neutrophils 66      % Lymphocytes 23      % Monocytes 8      %  Eosinophils 2      % Basophils 0      % Immature Granulocytes 1      NRBCs per 100 WBC 0      Absolute Neutrophils 5.1      Absolute Lymphocytes 1.8      Absolute Monocytes 0.6      Absolute Eosinophils 0.2      Absolute Basophils 0.0      Absolute Immature Granulocytes 0.1      Absolute NRBCs 0.0          Procedures       Emergency Department Course & Assessments:             Interventions:  Medications - No data to display     Assessments:      Independent Interpretation (X-rays, CTs, rhythm strip):  None    Consultations/Discussion of Management or Tests:  None       Social Determinants of Health affecting care:  na     Disposition:  The patient was discharged to home.     Impression & Plan    CMS Diagnoses: None          Medical Decision Making:    Patient presents with discomfort to the jaw as well as to the arm.  Patient recently had surgery to his eye so has recently restarted to work.  Certainly musculoskeletal or cervical radiculopathy, acute coronary syndrome, parotitis, TMJ, mastoiditis dental etiology amongst others are considered.  Cardiac workup is negative.  Does have Q waves in inferior leads but troponin is normal.  Do not have an old EKG to compare to.  Palpation of the face did not feel any fullness or swelling or evidence of parotitis.  There is no pain over the mastoid bone.  There is no pain to his dentition.  Discussed outpatient management with him including course of Tylenol, Robaxin and prednisone and outpatient stress test given the abnormal EKG.    Critical Care time:  was 0 minutes for this patient excluding procedures.    Diagnosis:    ICD-10-CM    1. Cervical radiculopathy  M54.12       2. Atypical chest pain  R07.89 Echo Stress Echocardiogram           Discharge Medications:  Discharge Medication List as of 2/24/2024  9:31 PM        START taking these medications    Details   methocarbamol (ROBAXIN) 500 MG tablet Take 2 tablets (1,000 mg) by mouth 4 times daily as needed for muscle  spasms, Disp-20 tablet, R-0, E-Prescribe      predniSONE (DELTASONE) 20 MG tablet Take two tablets (= 40mg) each day for 5 (five) days, Disp-10 tablet, R-0, E-Prescribe                Nagi Putnam MD  2/24/2024   Nagi Putnam MD Adams, Shaun L, MD  02/25/24 1784

## 2024-03-01 ENCOUNTER — HOSPITAL ENCOUNTER (OUTPATIENT)
Dept: CARDIOLOGY | Facility: CLINIC | Age: 49
Discharge: HOME | End: 2024-03-01
Attending: EMERGENCY MEDICINE | Admitting: EMERGENCY MEDICINE
Payer: COMMERCIAL

## 2024-03-01 DIAGNOSIS — R07.89 ATYPICAL CHEST PAIN: ICD-10-CM

## 2024-03-01 PROCEDURE — 93325 DOPPLER ECHO COLOR FLOW MAPG: CPT | Mod: 26 | Performed by: INTERNAL MEDICINE

## 2024-03-01 PROCEDURE — 93321 DOPPLER ECHO F-UP/LMTD STD: CPT | Mod: 26 | Performed by: INTERNAL MEDICINE

## 2024-03-01 PROCEDURE — 93325 DOPPLER ECHO COLOR FLOW MAPG: CPT | Mod: TC

## 2024-03-01 PROCEDURE — 93016 CV STRESS TEST SUPVJ ONLY: CPT | Performed by: INTERNAL MEDICINE

## 2024-03-01 PROCEDURE — 93018 CV STRESS TEST I&R ONLY: CPT | Performed by: INTERNAL MEDICINE

## 2024-03-01 PROCEDURE — 93350 STRESS TTE ONLY: CPT | Mod: 26 | Performed by: INTERNAL MEDICINE

## 2024-06-16 ENCOUNTER — HEALTH MAINTENANCE LETTER (OUTPATIENT)
Age: 49
End: 2024-06-16

## 2025-05-27 ENCOUNTER — APPOINTMENT (OUTPATIENT)
Dept: URBAN - METROPOLITAN AREA CLINIC 256 | Age: 50
Setting detail: DERMATOLOGY
End: 2025-05-28

## 2025-05-27 VITALS — WEIGHT: 145 LBS | HEIGHT: 64 IN

## 2025-05-27 DIAGNOSIS — L81.0 POSTINFLAMMATORY HYPERPIGMENTATION: ICD-10-CM

## 2025-05-27 DIAGNOSIS — L82.0 INFLAMED SEBORRHEIC KERATOSIS: ICD-10-CM

## 2025-05-27 DIAGNOSIS — L91.8 OTHER HYPERTROPHIC DISORDERS OF THE SKIN: ICD-10-CM

## 2025-05-27 PROCEDURE — OTHER PRESCRIPTION MEDICATION MANAGEMENT: OTHER

## 2025-05-27 PROCEDURE — OTHER SKIN TAG REMOVAL: OTHER

## 2025-05-27 PROCEDURE — OTHER PRESCRIPTION: OTHER

## 2025-05-27 PROCEDURE — OTHER MIPS QUALITY: OTHER

## 2025-05-27 PROCEDURE — OTHER LIQUID NITROGEN: OTHER

## 2025-05-27 PROCEDURE — OTHER PATIENT SPECIFIC COUNSELING: OTHER

## 2025-05-27 PROCEDURE — OTHER COUNSELING: OTHER

## 2025-05-27 PROCEDURE — OTHER PHOTO-DOCUMENTATION: OTHER

## 2025-05-27 RX ORDER — FLUOCINOLONE ACETONIDE, HYDROQUINONE, AND TRETINOIN .1; 40; .5 MG/G; MG/G; MG/G
CREAM TOPICAL
Qty: 30 | Refills: 3 | Status: ERX | COMMUNITY
Start: 2025-05-27

## 2025-05-27 ASSESSMENT — LOCATION DETAILED DESCRIPTION DERM
LOCATION DETAILED: RIGHT CLAVICULAR NECK
LOCATION DETAILED: RIGHT MID TEMPLE
LOCATION DETAILED: LEFT CLAVICULAR NECK
LOCATION DETAILED: LEFT SUPERIOR ANTERIOR NECK

## 2025-05-27 ASSESSMENT — LOCATION ZONE DERM
LOCATION ZONE: FACE
LOCATION ZONE: NECK

## 2025-05-27 ASSESSMENT — LOCATION SIMPLE DESCRIPTION DERM
LOCATION SIMPLE: RIGHT TEMPLE
LOCATION SIMPLE: LEFT ANTERIOR NECK
LOCATION SIMPLE: RIGHT ANTERIOR NECK

## 2025-06-21 ENCOUNTER — HEALTH MAINTENANCE LETTER (OUTPATIENT)
Age: 50
End: 2025-06-21